# Patient Record
Sex: FEMALE | Race: WHITE | NOT HISPANIC OR LATINO | URBAN - NONMETROPOLITAN AREA
[De-identification: names, ages, dates, MRNs, and addresses within clinical notes are randomized per-mention and may not be internally consistent; named-entity substitution may affect disease eponyms.]

---

## 2020-02-12 ENCOUNTER — APPOINTMENT (RX ONLY)
Dept: URBAN - NONMETROPOLITAN AREA CLINIC 1 | Facility: CLINIC | Age: 55
Setting detail: DERMATOLOGY
End: 2020-02-12

## 2020-02-12 DIAGNOSIS — L98.8 OTHER SPECIFIED DISORDERS OF THE SKIN AND SUBCUTANEOUS TISSUE: ICD-10-CM

## 2020-02-12 PROCEDURE — ? BOTOX

## 2020-02-12 NOTE — PROCEDURE: BOTOX
Post-Care Instructions: Patient instructed do not lie down or bend over 4 hours after treatment. Do not manipulate treatment area and limit physical activity for 24 hours. Patient instructed not to travel by airplane for 48 hours.\\n\\nResults with botox take 5 days to appear and improve up to 2 weeks post injection.  \\n\\nCall the clinic if any questions or concerns arise at [phone]. Post-Care Instructions: Patient instructed do not lie down or bend over 4 hours after treatment. Do not manipulate treatment area and limit physical activity for 24 hours. Patient instructed not to travel by airplane for 48 hours.\\n\\nResults with botox take 5 days to appear and improve up to 2 weeks post injection.  \\n\\nCall the clinic if any questions or concerns arise at [phone].

## 2020-06-08 ENCOUNTER — APPOINTMENT (RX ONLY)
Dept: URBAN - NONMETROPOLITAN AREA CLINIC 1 | Facility: CLINIC | Age: 55
Setting detail: DERMATOLOGY
End: 2020-06-08

## 2020-06-08 DIAGNOSIS — L98.8 OTHER SPECIFIED DISORDERS OF THE SKIN AND SUBCUTANEOUS TISSUE: ICD-10-CM

## 2020-06-08 PROCEDURE — ? BOTOX

## 2020-06-08 PROCEDURE — ? ADDITIONAL NOTES

## 2020-06-08 NOTE — PROCEDURE: BOTOX
Post-Care Instructions: Patient instructed do not lie down or bend over 4 hours after treatment. Do not manipulate treatment area and limit physical activity for 24 hours. Patient instructed not to travel by airplane for 48 hours.\\n\\nResults with botox take 5 days to appear and improve up to 2 weeks post injection.  \\n\\nCall the clinic if any questions or concerns arise at [phone]. Post-Care Instructions: Patient instructed do not lie down or bend over 4 hours after treatment. Do not manipulate treatment area and limit physical activity for 24 hours. Patient instructed not to travel by airplane for 48 hours.\\n\\nResults with botox take 5 days to appear and improve up to 2 weeks post injection.  \\n\\nCall the clinic if any questions or concerns arise at [phone].

## 2020-06-08 NOTE — PROCEDURE: ADDITIONAL NOTES
Additional Notes: Patient consent was obtained to proceed with the visit and recommended plan of care after discussion of all risks and benefits, including the risks of COVID-19 exposure.
Detail Level: Simple
Detail Level: Zone
Additional Notes: Focusing on lower crows

## 2020-09-08 ENCOUNTER — APPOINTMENT (RX ONLY)
Dept: URBAN - NONMETROPOLITAN AREA CLINIC 1 | Facility: CLINIC | Age: 55
Setting detail: DERMATOLOGY
End: 2020-09-08

## 2020-09-08 DIAGNOSIS — Z41.9 ENCOUNTER FOR PROCEDURE FOR PURPOSES OTHER THAN REMEDYING HEALTH STATE, UNSPECIFIED: ICD-10-CM

## 2020-09-08 PROCEDURE — ? BOTOX

## 2020-09-08 NOTE — PROCEDURE: BOTOX
Post-Care Instructions: Patient instructed do not lie down or bend over 4 hours after treatment. Do not manipulate treatment area and limit physical activity for 24 hours. Patient instructed not to travel by airplane for 48 hours. It can take 3-11 days to start seeing results. Please call if you have any concerns.\\n\\nResults with botox take 5 days to appear and improve up to 2 weeks post injection.  \\n\\nCall the clinic if any questions or concerns arise at [phone]. Post-Care Instructions: Patient instructed do not lie down or bend over 4 hours after treatment. Do not manipulate treatment area and limit physical activity for 24 hours. Patient instructed not to travel by airplane for 48 hours. It can take 3-11 days to start seeing results. Please call if you have any concerns.\\n\\nResults with botox take 5 days to appear and improve up to 2 weeks post injection.  \\n\\nCall the clinic if any questions or concerns arise at [phone].

## 2021-01-05 ENCOUNTER — APPOINTMENT (RX ONLY)
Dept: URBAN - NONMETROPOLITAN AREA CLINIC 1 | Facility: CLINIC | Age: 56
Setting detail: DERMATOLOGY
End: 2021-01-05

## 2021-01-05 DIAGNOSIS — Z41.9 ENCOUNTER FOR PROCEDURE FOR PURPOSES OTHER THAN REMEDYING HEALTH STATE, UNSPECIFIED: ICD-10-CM

## 2021-01-05 PROCEDURE — ? BOTOX

## 2021-01-05 NOTE — PROCEDURE: BOTOX
Post-Care Instructions: Patient instructed do not lie down or bend over 4 hours after treatment. Do not manipulate treatment area and limit physical activity for 24 hours. Patient instructed not to travel by airplane for 48 hours. It can take 3-11 days to start seeing results. Please call if you have any concerns.\\n\\nResults with botox take 5 days to appear and improve up to 2 weeks post injection.  \\n\\nCall the clinic if any questions or concerns arise at [phone]. Post-Care Instructions: Patient instructed do not lie down or bend over 4 hours after treatment. Do not manipulate treatment area and limit physical activity for 24 hours. Patient instructed not to travel by airplane for 48 hours. It can take 3-11 days to start seeing results. Please call if you have any concerns.\\n\\nResults with botox take 5 days to appear and improve up to 2 weeks post injection.  \\n\\nCall the clinic if any questions or concerns arise at [phone].